# Patient Record
Sex: FEMALE | Employment: FULL TIME | URBAN - METROPOLITAN AREA
[De-identification: names, ages, dates, MRNs, and addresses within clinical notes are randomized per-mention and may not be internally consistent; named-entity substitution may affect disease eponyms.]

---

## 2023-11-22 ENCOUNTER — LAB ENCOUNTER (OUTPATIENT)
Dept: LAB | Facility: HOSPITAL | Age: 33
End: 2023-11-22
Attending: INTERNAL MEDICINE
Payer: MEDICAID

## 2023-11-22 ENCOUNTER — OFFICE VISIT (OUTPATIENT)
Dept: RHEUMATOLOGY | Facility: CLINIC | Age: 33
End: 2023-11-22
Payer: MEDICAID

## 2023-11-22 VITALS
HEIGHT: 66 IN | DIASTOLIC BLOOD PRESSURE: 83 MMHG | SYSTOLIC BLOOD PRESSURE: 131 MMHG | HEART RATE: 80 BPM | BODY MASS INDEX: 36.88 KG/M2 | WEIGHT: 229.5 LBS | RESPIRATION RATE: 16 BRPM

## 2023-11-22 DIAGNOSIS — M79.10 MYALGIA: Primary | ICD-10-CM

## 2023-11-22 DIAGNOSIS — R51.9 NONINTRACTABLE HEADACHE, UNSPECIFIED CHRONICITY PATTERN, UNSPECIFIED HEADACHE TYPE: ICD-10-CM

## 2023-11-22 LAB
CRP SERPL-MCNC: <0.4 MG/DL (ref ?–1)
ERYTHROCYTE [SEDIMENTATION RATE] IN BLOOD: 23 MM/HR

## 2023-11-22 PROCEDURE — 86200 CCP ANTIBODY: CPT | Performed by: INTERNAL MEDICINE

## 2023-11-22 PROCEDURE — 85652 RBC SED RATE AUTOMATED: CPT | Performed by: INTERNAL MEDICINE

## 2023-11-22 PROCEDURE — 99244 OFF/OP CNSLTJ NEW/EST MOD 40: CPT | Performed by: INTERNAL MEDICINE

## 2023-11-22 PROCEDURE — 86140 C-REACTIVE PROTEIN: CPT | Performed by: INTERNAL MEDICINE

## 2023-11-22 PROCEDURE — 3075F SYST BP GE 130 - 139MM HG: CPT | Performed by: INTERNAL MEDICINE

## 2023-11-22 PROCEDURE — 3079F DIAST BP 80-89 MM HG: CPT | Performed by: INTERNAL MEDICINE

## 2023-11-22 PROCEDURE — 36415 COLL VENOUS BLD VENIPUNCTURE: CPT | Performed by: INTERNAL MEDICINE

## 2023-11-22 PROCEDURE — 3008F BODY MASS INDEX DOCD: CPT | Performed by: INTERNAL MEDICINE

## 2023-11-22 RX ORDER — CITALOPRAM 20 MG/1
20 TABLET ORAL DAILY
COMMUNITY
Start: 2023-10-30

## 2023-11-22 RX ORDER — LAMOTRIGINE 25 MG/1
25 TABLET ORAL 2 TIMES DAILY
COMMUNITY

## 2023-11-22 RX ORDER — CELECOXIB 200 MG/1
CAPSULE ORAL
COMMUNITY
Start: 2022-06-02

## 2023-11-22 NOTE — PATIENT INSTRUCTIONS
You were seen today for worsening pain in the left side of your body, numbness, headaches and lower abdominal pain  Symptoms are not consistent with an autoimmune process  Blood work in the past was negative for rheumatoid arthritis and lupus  I would recommend to discuss with your psychiatrist to possibly switch the citalopram to Cymbalta or gabapentin to see if that helps some of your muscle and nerve pain  Blood work today

## 2023-11-24 LAB — CCP IGG SERPL-ACNC: 1.6 U/ML (ref 0–6.9)

## 2024-03-01 ENCOUNTER — TELEPHONE (OUTPATIENT)
Dept: NEUROLOGY | Facility: CLINIC | Age: 34
End: 2024-03-01

## 2024-03-01 ENCOUNTER — OFFICE VISIT (OUTPATIENT)
Dept: NEUROLOGY | Facility: CLINIC | Age: 34
End: 2024-03-01
Payer: MEDICAID

## 2024-03-01 ENCOUNTER — LAB ENCOUNTER (OUTPATIENT)
Dept: LAB | Facility: HOSPITAL | Age: 34
End: 2024-03-01
Attending: Other
Payer: MEDICAID

## 2024-03-01 VITALS — HEIGHT: 66 IN | WEIGHT: 240 LBS | BODY MASS INDEX: 38.57 KG/M2

## 2024-03-01 DIAGNOSIS — R20.0 LEFT SIDED NUMBNESS: ICD-10-CM

## 2024-03-01 DIAGNOSIS — G44.221 CHRONIC TENSION-TYPE HEADACHE, INTRACTABLE: ICD-10-CM

## 2024-03-01 DIAGNOSIS — G44.221 CHRONIC TENSION-TYPE HEADACHE, INTRACTABLE: Primary | ICD-10-CM

## 2024-03-01 DIAGNOSIS — G43.009 MIGRAINE WITHOUT AURA AND WITHOUT STATUS MIGRAINOSUS, NOT INTRACTABLE: ICD-10-CM

## 2024-03-01 LAB
T PALLIDUM AB SER QL IA: NONREACTIVE
TSI SER-ACNC: 1.34 MIU/ML (ref 0.55–4.78)
VIT B12 SERPL-MCNC: 386 PG/ML (ref 211–911)

## 2024-03-01 PROCEDURE — 83921 ORGANIC ACID SINGLE QUANT: CPT

## 2024-03-01 PROCEDURE — 86341 ISLET CELL ANTIBODY: CPT

## 2024-03-01 PROCEDURE — 99204 OFFICE O/P NEW MOD 45 MIN: CPT | Performed by: OTHER

## 2024-03-01 PROCEDURE — 84446 ASSAY OF VITAMIN E: CPT | Performed by: OTHER

## 2024-03-01 PROCEDURE — 86255 FLUORESCENT ANTIBODY SCREEN: CPT

## 2024-03-01 PROCEDURE — 86596 VOLTAGE-GTD CA CHNL ANTB EA: CPT

## 2024-03-01 PROCEDURE — 84207 ASSAY OF VITAMIN B-6: CPT | Performed by: OTHER

## 2024-03-01 PROCEDURE — 36415 COLL VENOUS BLD VENIPUNCTURE: CPT

## 2024-03-01 PROCEDURE — 82390 ASSAY OF CERULOPLASMIN: CPT

## 2024-03-01 PROCEDURE — 84443 ASSAY THYROID STIM HORMONE: CPT

## 2024-03-01 PROCEDURE — 86051 AQUAPORIN-4 ANTB ELISA: CPT

## 2024-03-01 PROCEDURE — 86780 TREPONEMA PALLIDUM: CPT

## 2024-03-01 PROCEDURE — 82607 VITAMIN B-12: CPT | Performed by: OTHER

## 2024-03-01 PROCEDURE — 86618 LYME DISEASE ANTIBODY: CPT

## 2024-03-01 RX ORDER — TOPIRAMATE 25 MG/1
TABLET ORAL
Qty: 270 TABLET | Refills: 3 | Status: SHIPPED | OUTPATIENT
Start: 2024-03-01

## 2024-03-01 NOTE — PROGRESS NOTES
[facility] NEUROSCIENCES [address], [facility] [address]  [facility] [address]  [phone]            Neurology Initial Visit     Referred By: Dr. Flores ref. provider found    Chief Complaint:   Chief Complaint   Patient presents with    Neurologic Problem     New patient states she usually dull pain from bilaterally from the head to her feet . She states she also has tightness on the left side of her body for more than 2 year. Last year the right side just began.Patient states she has been experiencing a lot of pain in stom ache area.       HPI:     Danyell Geiger is a 33 year old adult, who presents for generalized pain.  Patient with history of generalized stiffness and pain especially on the left side, going on for a number of years.  Associated with daily tension dull headache that gets especially worse around menstrual cycles.  The dose times he gets throbbing, especially on left side, associated with light sensitive, noise sensitivity, mild nausea.  Patient needs to lay down in dark and quiet room.  Patient lost their job eventually from this pain they had to take time off.  Apparently the MRI of the brain was done some years ago, apparently it was not revealing.  Patient was seen by rheumatologist, possibility for myalgia was entertained in the past, she tried multiple antidepressant medications, she has seen psychiatrist as well, some antipsychotic medications also tried in the past..     Past Medical History:   Diagnosis Date    Anxiety     CRISTA I (cervical intraepithelial neoplasia I) 07/07/2021    Depression     LGSIL on Pap smear of cervix 06/25/2021    PCOS (polycystic ovarian syndrome)     Sleep apnea     moderate       Past Surgical History:   Procedure Laterality Date    COLPOSCOPY,BX CERVIX/ENDOCERV CURR  07/07/2021    CRISTA 1    MIRENA, IUD, 5 YEAR  07/26/2021    Insertion        Social history:  History   Smoking Status    Never   Smokeless Tobacco    Never     History   Alcohol  Use Never     History   Drug Use Unknown       Family History   Problem Relation Age of Onset    Arthritis Father     Psoriasis Father     ADHD Mother     Breast Cancer Mother     Diabetes Maternal Grandfather     Diabetes Paternal Grandfather     Depression Sister     Anxiety Sister     Other (pcos) Sister     Depression Brother     Anxiety Brother          Current Outpatient Medications:     topiramate 25 MG Oral Tab, Start with 1 pill QHS, for 1 week, then 2 pills qhs foor another week, then 3 pills qhs, Disp: 270 tablet, Rfl: 3    celecoxib 200 MG Oral Cap, , Disp: , Rfl:     lamoTRIgine 25 MG Oral Tab, Take 1 tablet (25 mg total) by mouth 2 (two) times daily., Disp: , Rfl:     citalopram 20 MG Oral Tab, Take 1 tablet (20 mg total) by mouth daily., Disp: , Rfl:     Allergies   Allergen Reactions    Bananas OTHER (SEE COMMENTS)    Pineapple OTHER (SEE COMMENTS)    Kiwi Extract ITCHING       ROS:   As in HPI, the rest of the 14 system review was done and was negative      Physical Exam:  Vitals:    03/01/24 0847   Weight: 240 lb (108.9 kg)   Height: 66\"       General: No apparent distress, well nourished, well groomed.  Head- Normocephalic, atraumatic  Eyes- No redness or swelling  ENT- Hearing intake, normal glutition  Neck- No masses or adenopathy  Cv: pulses were palpable and normal, no cyanosis or edema     Neurological:     Mental Status- Alert and oriented x3.  Normal attention span and concentration  Thought process intact  Memory intact- recent and remote  Mood depressed and anxious  Fund of knowledge appropriate for education and age    Language intact including: comprehension, naming, repetition, vocabulary    Cranial Nerves:  II.- Visual fields full to confrontation  III, IV, VI- EOM intact, JESSY  V. Facial sensation intact  VII. Face symmetric, no facial weakness  VIII. Hearing intact to whisper.  IX. Pallet elevates symmetrically.  XI. Shoulder shrug is intact  XII. Tongue is midline    Motor  Exam:  Muscle tone normal  No atrophy or fasciculations  Strength- upper extremities 5/5 proximally and distally                  - lower  extremities 5/5 proximally and distally    Sensory Exam:  Light touch sensation- intact in all 4 extremities    Deep Tendon Reflexes:  Biceps 2+ bilateral symmetric  Triceps 2+ bilateral symmetric  Brachioradialis 2 + bilateral symmetric  Patellar 2+ bilateral symmetric  Ankle jerk 2+ bilateral symmetric    No clonus  No Babinski sign    Coordination:  Finger to nose intact  Rapid alternating movements intact    Gait:  Normal posture  Normal physiologic      Labs:    No results found for: \"TSH\"  No results found for: \"CHOL\", \"HDL\", \"LDL\", \"TRIG\"  No results found for: \"HGB\", \"HCT\", \"MCV\", \"WBC\", \"ALC\", \"PLT\"   No results found for: \"GLUCOSE\", \"BUN\", \"CREAT\", \"CA\", \"ALT\", \"AST\", \"ALKPHOS\", \"ALB\", \"NA\", \"K\", \"CL\", \"CO2\"   I have reviewed labs.        Assessment   1. Chronic tension-type headache, intractable  Possible chronic tension headaches combined with some menstrual cycle migraines.  Will do a slow taper of topiramate.  Slow tapering of the dose.  - MRI BRAIN (W+WO) (CPT=70553); Future  - MRI SPINE CERVICAL (W+WO) (CPT=72156); Future  - Autoimmune Neurology/Comprehensive Paraneoplastic Profile; Future  - T Pallidum Screening Cascade; Future  - Methylmalonic Acid, Serum; Future  - TSH W Reflex To Free T4; Future  - Vitamin B12  - Vitamin B6  - Vitamin E, Serum  - Lyme Disease, Total Ab W Rflx; Future  - Ceruloplasmin; Future    2. Migraine without aura and without status migrainosus, not intractable    - MRI BRAIN (W+WO) (CPT=70553); Future  - MRI SPINE CERVICAL (W+WO) (CPT=72156); Future  - Autoimmune Neurology/Comprehensive Paraneoplastic Profile; Future  - T Pallidum Screening Cascade; Future  - Methylmalonic Acid, Serum; Future  - TSH W Reflex To Free T4; Future  - Vitamin B12  - Vitamin B6  - Vitamin E, Serum  - Lyme Disease, Total Ab W Rflx; Future  - Ceruloplasmin;  Future    3. Left sided numbness  Possible central sensitization syndrome but rule out any other etiology especially with patient's symptoms are more prominent on left side MRI of the brain and cervical spine for the bladder will be done.    - MRI BRAIN (W+WO) (CPT=70553); Future  - MRI SPINE CERVICAL (W+WO) (CPT=72156); Future  - Autoimmune Neurology/Comprehensive Paraneoplastic Profile; Future  - T Pallidum Screening Cascade; Future  - Methylmalonic Acid, Serum; Future  - TSH W Reflex To Free T4; Future  - Vitamin B12  - Vitamin B6  - Vitamin E, Serum  - Lyme Disease, Total Ab W Rflx; Future  - Ceruloplasmin; Future           Education and counseling provided to patient. Instructed patient to call my office or seek medical attention immediately if symptoms worsen.  Patient verbalized understanding of information given. All questions were answered. All side effects of drugs were discussed.     Return to clinic in: Return in about 4 weeks (around 3/29/2024).    Alex Pierce MD

## 2024-03-02 LAB — CERULOPLASMIN SERPL-MCNC: 29.6 MG/DL (ref 20–60)

## 2024-03-04 ENCOUNTER — TELEPHONE (OUTPATIENT)
Dept: NEUROLOGY | Facility: CLINIC | Age: 34
End: 2024-03-04

## 2024-03-04 LAB — B BURGDOR IGG+IGM SER QL: NEGATIVE

## 2024-03-04 NOTE — TELEPHONE ENCOUNTER
----- Message from Alex Pierce MD sent at 3/4/2024  7:26 AM CST -----  Please let the patient know that results of these particular lab tests so far were normal.    Thank you

## 2024-03-05 LAB — METHYLMALONIC ACID: 97 NMOL/L

## 2024-03-06 ENCOUNTER — TELEPHONE (OUTPATIENT)
Dept: NEUROLOGY | Facility: CLINIC | Age: 34
End: 2024-03-06

## 2024-03-06 LAB
AGNA-1: NEGATIVE
AMPA-R1 ANTIBODY: NEGATIVE
AMPA-R2 ANTIBODY: NEGATIVE
AMPHIPHYSIN ANTIBODY: NEGATIVE
ANTI-HU AB: NEGATIVE
ANTI-RI AB: NEGATIVE
ANTI-YO AB: NEGATIVE
ANTINERUONAL NUCLEAR AB TYPE 3: NEGATIVE
AQUAPORIN 4 ANTIBODY: NEGATIVE
CASPR2 ANTIBODY,CELL-BASED IFA: NEGATIVE
CRMP-5 IGG: NEGATIVE
DNER ANTIBODY: NEGATIVE
DPPX ANTIBODY: NEGATIVE
GABA-B-R ANTIBODY: NEGATIVE
GAD65 ANTIBODY: NEGATIVE
IGLON5 ANTIBODY: NEGATIVE
INTERPRETATION: NEGATIVE
ITPR1 ANTIBODY: NEGATIVE
LGI1 ANTIBODY, CELL-BASED IFA: NEGATIVE
MA2/TA ANTIBODY: NEGATIVE
MGLUR1 ANTIBODY: NEGATIVE
NMDA-R ANTIBODY: NEGATIVE
PURKINJE CELL CYTO AB TYPE 2: NEGATIVE
PURKINJE CELL CYTO AB TYPE TR: NEGATIVE
VGCC ANTIBODY: <1 PMOL/L
VITAMIN B6: 21.4 UG/L
ZIC4 ANTIBODY: NEGATIVE

## 2024-03-06 NOTE — TELEPHONE ENCOUNTER
----- Message from Alex Pierce MD sent at 3/6/2024  7:06 AM CST -----  Please let the patient know that results of these particular lab tests so far were normal.    Thank you

## 2024-03-07 ENCOUNTER — TELEPHONE (OUTPATIENT)
Dept: NEUROLOGY | Facility: CLINIC | Age: 34
End: 2024-03-07

## 2024-03-07 LAB
VIT E ALPHA TOCO: 12.7 MG/L
VIT E GAMMA TOCO: 2 MG/L

## 2024-03-07 NOTE — TELEPHONE ENCOUNTER
----- Message from Alex Pierce MD sent at 3/7/2024  6:43 AM CST -----  Please let the patient know that results of these particular lab tests so far were normal.    Thank you

## 2024-03-21 ENCOUNTER — PATIENT MESSAGE (OUTPATIENT)
Dept: NEUROLOGY | Facility: CLINIC | Age: 34
End: 2024-03-21

## 2024-03-22 NOTE — TELEPHONE ENCOUNTER
Dr. Pierce, please review and advise on the Topiramate - she is currently taking 3 pills at bedtime.  Pt states she is \"experiencing feeling excessive exhaustion/sleepiness to the point where I do not feel safe driving.\"

## 2024-04-02 ENCOUNTER — OFFICE VISIT (OUTPATIENT)
Dept: NEUROLOGY | Facility: CLINIC | Age: 34
End: 2024-04-02
Payer: MEDICAID

## 2024-04-02 DIAGNOSIS — G43.009 MIGRAINE WITHOUT AURA AND WITHOUT STATUS MIGRAINOSUS, NOT INTRACTABLE: Primary | ICD-10-CM

## 2024-04-02 DIAGNOSIS — G44.221 CHRONIC TENSION-TYPE HEADACHE, INTRACTABLE: ICD-10-CM

## 2024-04-02 DIAGNOSIS — M54.2 NECK PAIN: ICD-10-CM

## 2024-04-02 PROCEDURE — 99214 OFFICE O/P EST MOD 30 MIN: CPT | Performed by: OTHER

## 2024-04-02 RX ORDER — AMITRIPTYLINE HYDROCHLORIDE 10 MG/1
TABLET, FILM COATED ORAL
Qty: 90 TABLET | Refills: 5 | Status: SHIPPED | OUTPATIENT
Start: 2024-04-02

## 2024-04-02 NOTE — PROGRESS NOTES
[facility] [address], [facility] [address]  [facility] [address]  [phone]            Neurology follow-up visit     Referred By: Dr. Flores ref. provider found    Chief Complaint:   Chief Complaint   Patient presents with    Neurologic Problem     LOV 03/01/24  Pt here for 1 month follow up. Pt has MRI scheduled for the end of April. Headaches are still every day. Pt states that the topiramate was causing too many side effects.        HPI:     Danyell Geiger is a 33 year old adult, who presents for generalized pain.  Patient with history of generalized stiffness and pain especially on the left side, going on for a number of years.  Associated with daily tension dull headache that gets especially worse around menstrual cycles.  The dose times he gets throbbing, especially on left side, associated with light sensitive, noise sensitivity, mild nausea.  Patient needs to lay down in dark and quiet room.  Patient lost their job eventually from this pain they had to take time off.  Apparently the MRI of the brain was done some years ago, apparently it was not revealing.  Patient was seen by rheumatologist, possibility for myalgia was entertained in the past, she tried multiple antidepressant medications, she has seen psychiatrist as well, some antipsychotic medications also tried in the past.    Topiramate was tried, but caused local side effects, especially tingling and worsening of pain.  And it was tapered off.    Patient came in for follow-up in April 2024.  Still frequent headaches.  Continued with neck pain, tension feeling especially on the left side, some shooting pain from the left elbow down to the left hand..     Past Medical History:   Diagnosis Date    Anxiety     CRISTA I (cervical intraepithelial neoplasia I) 07/07/2021    Depression     LGSIL on Pap smear of cervix 06/25/2021    PCOS (polycystic ovarian syndrome)     Sleep apnea     moderate       Past Surgical History:    Procedure Laterality Date    COLPOSCOPY,BX CERVIX/ENDOCERV CURR  07/07/2021    CRISTA 1    MIRENA, IUD, 5 YEAR  07/26/2021    Insertion        Social history:  History   Smoking Status    Never   Smokeless Tobacco    Never     History   Alcohol Use Never     History   Drug Use Unknown       Family History   Problem Relation Age of Onset    Arthritis Father     Psoriasis Father     ADHD Mother     Breast Cancer Mother     Diabetes Maternal Grandfather     Diabetes Paternal Grandfather     Depression Sister     Anxiety Sister     Other (pcos) Sister     Depression Brother     Anxiety Brother          Current Outpatient Medications:     amitriptyline 10 MG Oral Tab, Start with 1 pill QHS, for 1 week, then 2 pills qhs foor another week, then 3 pills qhs, Disp: 90 tablet, Rfl: 5    celecoxib 200 MG Oral Cap, , Disp: , Rfl:     lamoTRIgine 25 MG Oral Tab, Take 1 tablet (25 mg total) by mouth 2 (two) times daily., Disp: , Rfl:     Allergies   Allergen Reactions    Bananas OTHER (SEE COMMENTS)    Pineapple OTHER (SEE COMMENTS)    Kiwi Extract ITCHING       ROS:   As in HPI, the rest of the 14 system review was done and was negative      Physical Exam:  There were no vitals filed for this visit.      General: No apparent distress, well nourished, well groomed.  Head- Normocephalic, atraumatic  Eyes- No redness or swelling  ENT- Hearing intake, normal glutition  Neck- No masses or adenopathy  Cv: pulses were palpable and normal, no cyanosis or edema     Neurological:     Mental Status- Alert and oriented x3.  Normal attention span and concentration  Thought process intact  Memory intact- recent and remote  Mood depressed and anxious  Fund of knowledge appropriate for education and age    Language intact including: comprehension, naming, repetition, vocabulary    Cranial Nerves:  II.- Visual fields full to confrontation  III, IV, VI- EOM intact, JESSY  V. Facial sensation intact  VII. Face symmetric, no facial weakness  VIII.  Hearing intact to whisper.  IX. Pallet elevates symmetrically.  XI. Shoulder shrug is intact  XII. Tongue is midline    Motor Exam:  Muscle tone normal  No atrophy or fasciculations  Strength- upper extremities 5/5 proximally and distally                  - lower  extremities 5/5 proximally and distally    Sensory Exam:  Light touch sensation- intact in all 4 extremities    Deep Tendon Reflexes:  Biceps 2+ bilateral symmetric  Triceps 2+ bilateral symmetric  Brachioradialis 2 + bilateral symmetric  Patellar 2+ bilateral symmetric  Ankle jerk 2+ bilateral symmetric    No clonus  No Babinski sign    Coordination:  Finger to nose intact  Rapid alternating movements intact    Gait:  Normal posture  Normal physiologic      Labs:    Lab Results   Component Value Date    TSH 1.342 03/01/2024     No results found for: \"CHOL\", \"HDL\", \"LDL\", \"TRIG\"  No results found for: \"HGB\", \"HCT\", \"MCV\", \"WBC\", \"ALC\", \"PLT\"   No results found for: \"GLUCOSE\", \"BUN\", \"CREAT\", \"CA\", \"ALT\", \"AST\", \"ALKPHOS\", \"ALB\", \"NA\", \"K\", \"CL\", \"CO2\"   I have reviewed labs.        Assessment   1. Chronic tension-type headache, intractable  Possible chronic tension headaches combined with some menstrual cycle migraines.  Patient had side effects with topiramate, it was tapered off, at this time amitriptyline be tried    2. Migraine without aura and without status migrainosus, not intractable    3. Left sided numbness  Possible central sensitization syndrome but rule out any other etiology especially with patient's symptoms are more prominent on left side MRI of the brain and cervical spine   In the meantime physical therapy for the neck pain will also be ordered.  If it is not revealing the neck step will be trying to do in EMG of left upper extremity.         Education and counseling provided to patient. Instructed patient to call my office or seek medical attention immediately if symptoms worsen.  Patient verbalized understanding of information given.  All questions were answered. All side effects of drugs were discussed.     Return to clinic in: Return in about 2 months (around 6/2/2024).    Alex Pierce MD

## 2024-04-09 ENCOUNTER — PATIENT MESSAGE (OUTPATIENT)
Dept: NEUROLOGY | Facility: CLINIC | Age: 34
End: 2024-04-09

## 2024-04-09 NOTE — TELEPHONE ENCOUNTER
Please advise if we can double book the patient the week of 5/12? Possibly on 5/16/24 (17 patients currently scheduled) as this would be the week 6 of treatment on amitriptyline.   Reviewed and electronically signed by: SHO Salter

## 2024-06-04 ENCOUNTER — OFFICE VISIT (OUTPATIENT)
Dept: NEUROLOGY | Facility: CLINIC | Age: 34
End: 2024-06-04
Payer: MEDICAID

## 2024-06-04 VITALS — HEIGHT: 65 IN | WEIGHT: 240 LBS | BODY MASS INDEX: 39.99 KG/M2

## 2024-06-04 DIAGNOSIS — G43.009 MIGRAINE WITHOUT AURA AND WITHOUT STATUS MIGRAINOSUS, NOT INTRACTABLE: Primary | ICD-10-CM

## 2024-06-04 DIAGNOSIS — G44.221 CHRONIC TENSION-TYPE HEADACHE, INTRACTABLE: ICD-10-CM

## 2024-06-04 PROCEDURE — 99213 OFFICE O/P EST LOW 20 MIN: CPT | Performed by: OTHER

## 2024-06-04 RX ORDER — SUMATRIPTAN 25 MG/1
TABLET, FILM COATED ORAL
COMMUNITY

## 2024-06-04 RX ORDER — AMITRIPTYLINE HYDROCHLORIDE 10 MG/1
TABLET, FILM COATED ORAL
Qty: 270 TABLET | Refills: 3 | Status: SHIPPED | OUTPATIENT
Start: 2024-06-04

## 2024-06-04 NOTE — PROGRESS NOTES
[facility] [address], [address]  [facility] [address]  [phone]            Neurology follow-up visit     Referred By: Dr. Flores ref. provider found    Chief Complaint:   Chief Complaint   Patient presents with    Migraine     LOV 4/2/2024 Pt here today for Migraine follow-up. Pt stated headaches are still every day. Pt states SUMAtriptan 25 MG has been helping, pt stated has no migraines.        HPI:     Danyell Geiger is a 33 year old adult, who presents for generalized pain.  Patient with history of generalized stiffness and pain especially on the left side, going on for a number of years.  Associated with daily tension dull headache that gets especially worse around menstrual cycles.  The dose times he gets throbbing, especially on left side, associated with light sensitive, noise sensitivity, mild nausea.  Patient needs to lay down in dark and quiet room.  Patient lost their job eventually from this pain they had to take time off.  Apparently the MRI of the brain was done some years ago, apparently it was not revealing.  Patient was seen by rheumatologist, possibility for myalgia was entertained in the past, she tried multiple antidepressant medications, she has seen psychiatrist as well, some antipsychotic medications also tried in the past.    Topiramate was tried, but caused local side effects, especially tingling and worsening of pain.  And it was tapered off.    Patient came in for follow-up in April 2024.  Still frequent headaches.  Continued with neck pain, tension feeling especially on the left side, some shooting pain from the left elbow down to the left hand.  Topiramate was tapered off due to side effects and instead amitriptyline was started.    Patient came back for follow-up in June 2024.  Reports significant improvement of headaches and left-sided symptoms once she started with amitriptyline and continue 30 mg daily dose.  No side effects.  She was also  trying to get better sleep and exercise more regularly..     Past Medical History:    Anxiety    CRISTA I (cervical intraepithelial neoplasia I)    Depression    LGSIL on Pap smear of cervix    PCOS (polycystic ovarian syndrome)    Sleep apnea    moderate       Past Surgical History:   Procedure Laterality Date    Colposcopy,bx cervix/endocerv curr  07/07/2021    CRISTA 1    Mirena, iud, 5 year  07/26/2021    Insertion        Social history:  History   Smoking Status    Never   Smokeless Tobacco    Never     History   Alcohol Use Never     History   Drug Use Unknown       Family History   Problem Relation Age of Onset    Arthritis Father     Psoriasis Father     ADHD Mother     Breast Cancer Mother     Diabetes Maternal Grandfather     Diabetes Paternal Grandfather     Depression Sister     Anxiety Sister     Other (pcos) Sister     Depression Brother     Anxiety Brother          Current Outpatient Medications:     SUMAtriptan 25 MG Oral Tab, TAKE 1 TABLET BY MOUTH AT ONSET OF MIGRAINE. MAY REPEAT AFTER 2 HOURS. IF HEADACHE RETURNS. NOT TO EXCEED 100 MG IN 24 HOURS, Disp: , Rfl:     Allergies   Allergen Reactions    Bananas OTHER (SEE COMMENTS)    Pineapple OTHER (SEE COMMENTS)    Kiwi Extract ITCHING       ROS:   As in HPI, the rest of the 14 system review was done and was negative      Physical Exam:  Vitals:    06/04/24 0848   Weight: 240 lb (108.9 kg)   Height: 65\"         General: No apparent distress, well nourished, well groomed.  Head- Normocephalic, atraumatic  Eyes- No redness or swelling  ENT- Hearing intake, normal glutition  Neck- No masses or adenopathy  Cv: pulses were palpable and normal, no cyanosis or edema     Neurological:     Mental Status- Alert and oriented x3.  Normal attention span and concentration  Thought process intact  Memory intact- recent and remote  Mood depressed and anxious  Fund of knowledge appropriate for education and age    Language intact including: comprehension, naming,  repetition, vocabulary    Cranial Nerves:  VII. Face symmetric, no facial weakness  VIII. Hearing intact to whisper.  IX. Pallet elevates symmetrically.  XI. Shoulder shrug is intact  XII. Tongue is midline    Motor Exam:  Muscle tone normal  No atrophy or fasciculations  Strength- upper extremities 5/5 proximally and distally    Coordination:  Finger to nose intact  Rapid alternating movements intact    Gait:  Normal posture  Normal physiologic      Labs:    Lab Results   Component Value Date    TSH 1.342 03/01/2024     No results found for: \"CHOL\", \"HDL\", \"LDL\", \"TRIG\"  No results found for: \"HGB\", \"HCT\", \"MCV\", \"WBC\", \"ALC\", \"PLT\"   No results found for: \"GLUCOSE\", \"BUN\", \"CREAT\", \"CA\", \"ALT\", \"AST\", \"ALKPHOS\", \"ALB\", \"NA\", \"K\", \"CL\", \"CO2\"   I have reviewed labs.        Assessment   1. Chronic tension-type headache, intractable  Possible chronic tension headaches combined with some menstrual cycle migraines.  Patient had side effects with topiramate, it was tapered off, amitriptyline was tried instead and was quite helpful.  Continue 30 mg daily dose.  She has not been using sumatriptan anymore.    2. Migraine without aura and without status migrainosus, not intractable    3. Left sided numbness  Seems to have resolved as well, possibly symptoms of migraine some cells.       Education and counseling provided to patient. Instructed patient to call my office or seek medical attention immediately if symptoms worsen.  Patient verbalized understanding of information given. All questions were answered. All side effects of drugs were discussed.     Return to clinic in: No follow-ups on file.    Alex Pierce MD

## 2024-08-26 NOTE — TELEPHONE ENCOUNTER
Message sent to pt to inquire on her current dose of amitriptyline.     Medication: amitriptyline 10 MG Oral Tab      Date of last refill: 06/04/2024 (#270/3)  [place]     Last office visit: 06/04/2024  Due back to clinic per last office note:  Annual   Date next office visit scheduled:  Not on file  No future appointments.        Last OV note recommendation:    Assessment  1. Chronic tension-type headache, intractable  Possible chronic tension headaches combined with some menstrual cycle migraines.  Patient had side effects with topiramate, it was tapered off, amitriptyline was tried instead and was quite helpful.  Continue 30 mg daily dose.  She has not been using sumatriptan anymore.     2. Migraine without aura and without status migrainosus, not intractable     3. Left sided numbness  Seems to have resolved as well, possibly symptoms of migraine some cells.           Education and counseling provided to patient. Instructed patient to call my office or seek medical attention immediately if symptoms worsen.  Patient verbalized understanding of information given. All questions were answered. All side effects of drugs were discussed.      Return to clinic in: No follow-ups on file.     Alex Pierce MD

## 2024-08-29 ENCOUNTER — PATIENT MESSAGE (OUTPATIENT)
Dept: NEUROLOGY | Facility: CLINIC | Age: 34
End: 2024-08-29

## 2024-08-29 RX ORDER — AMITRIPTYLINE HYDROCHLORIDE 10 MG/1
TABLET ORAL
Qty: 90 TABLET | Refills: 0 | OUTPATIENT
Start: 2024-08-29

## 2024-08-29 NOTE — TELEPHONE ENCOUNTER
From: Danyell Geiger  To: Alex Pierce  Sent: 8/29/2024 9:52 AM CDT  Subject: Do I need to schedule another appointment for medication refill    HiYsabel informed me I don’t have any more refills for future for the medication Amitryptiline and that my request to have refills in future was denied. Do I need to make an appointment with you to be able to get more refills in the future or should I just stop taking the medication once I run out next month.

## 2024-08-30 RX ORDER — AMITRIPTYLINE HYDROCHLORIDE 10 MG/1
TABLET ORAL
Qty: 270 TABLET | Refills: 3 | Status: SHIPPED | OUTPATIENT
Start: 2024-08-30

## 2025-05-06 ENCOUNTER — TELEPHONE (OUTPATIENT)
Dept: ORTHOPEDICS CLINIC | Facility: CLINIC | Age: 35
End: 2025-05-06

## 2025-05-06 DIAGNOSIS — M25.561 PAIN IN BOTH KNEES, UNSPECIFIED CHRONICITY: Primary | ICD-10-CM

## 2025-05-06 DIAGNOSIS — M25.562 PAIN IN BOTH KNEES, UNSPECIFIED CHRONICITY: Primary | ICD-10-CM

## 2025-05-06 NOTE — TELEPHONE ENCOUNTER
Patient is coming in for JOHN knee pain. She is aware to arrive 20 minutes early for xrays.  Future Appointments   Date Time Provider Department Center   5/7/2025  7:10 AM [facility] Story [id number]

## 2025-05-07 ENCOUNTER — HOSPITAL ENCOUNTER (OUTPATIENT)
Dept: GENERAL RADIOLOGY | Age: 35
Discharge: HOME | End: 2025-05-07
Attending: PHYSICIAN ASSISTANT
Payer: COMMERCIAL

## 2025-05-07 ENCOUNTER — OFFICE VISIT (OUTPATIENT)
Dept: ORTHOPEDICS CLINIC | Facility: CLINIC | Age: 35
End: 2025-05-07
Payer: COMMERCIAL

## 2025-05-07 VITALS — HEIGHT: 66 IN | WEIGHT: 220 LBS | BODY MASS INDEX: 35.36 KG/M2

## 2025-05-07 DIAGNOSIS — M76.52 PATELLAR TENDINITIS OF BOTH KNEES: ICD-10-CM

## 2025-05-07 DIAGNOSIS — M25.562 PAIN IN BOTH KNEES, UNSPECIFIED CHRONICITY: ICD-10-CM

## 2025-05-07 DIAGNOSIS — M76.51 PATELLAR TENDINITIS OF BOTH KNEES: ICD-10-CM

## 2025-05-07 DIAGNOSIS — M22.2X2 PATELLOFEMORAL PAIN SYNDROME OF BOTH KNEES: Primary | ICD-10-CM

## 2025-05-07 DIAGNOSIS — M25.561 PAIN IN BOTH KNEES, UNSPECIFIED CHRONICITY: ICD-10-CM

## 2025-05-07 DIAGNOSIS — M22.2X1 PATELLOFEMORAL PAIN SYNDROME OF BOTH KNEES: Primary | ICD-10-CM

## 2025-05-07 PROCEDURE — 73564 X-RAY EXAM KNEE 4 OR MORE: CPT | Performed by: PHYSICIAN ASSISTANT

## 2025-05-07 PROCEDURE — 3008F BODY MASS INDEX DOCD: CPT | Performed by: PHYSICIAN ASSISTANT

## 2025-05-07 PROCEDURE — 99203 OFFICE O/P NEW LOW 30 MIN: CPT | Performed by: PHYSICIAN ASSISTANT

## 2025-07-03 ENCOUNTER — MED REC SCAN ONLY (OUTPATIENT)
Facility: CLINIC | Age: 35
End: 2025-07-03